# Patient Record
Sex: MALE
[De-identification: names, ages, dates, MRNs, and addresses within clinical notes are randomized per-mention and may not be internally consistent; named-entity substitution may affect disease eponyms.]

---

## 2022-09-21 ENCOUNTER — NON-APPOINTMENT (OUTPATIENT)
Age: 27
End: 2022-09-21

## 2022-09-23 PROBLEM — Z00.00 ENCOUNTER FOR PREVENTIVE HEALTH EXAMINATION: Status: ACTIVE | Noted: 2022-09-23

## 2022-09-26 ENCOUNTER — APPOINTMENT (OUTPATIENT)
Dept: ORTHOPEDIC SURGERY | Facility: CLINIC | Age: 27
End: 2022-09-26

## 2022-09-26 VITALS
DIASTOLIC BLOOD PRESSURE: 75 MMHG | WEIGHT: 210 LBS | BODY MASS INDEX: 29.4 KG/M2 | HEIGHT: 71 IN | SYSTOLIC BLOOD PRESSURE: 137 MMHG | HEART RATE: 78 BPM

## 2022-09-26 DIAGNOSIS — Z78.9 OTHER SPECIFIED HEALTH STATUS: ICD-10-CM

## 2022-09-26 DIAGNOSIS — M79.644 PAIN IN LEFT FINGER(S): ICD-10-CM

## 2022-09-26 DIAGNOSIS — S63.619A UNSPECIFIED SPRAIN OF UNSPECIFIED FINGER, INITIAL ENCOUNTER: ICD-10-CM

## 2022-09-26 DIAGNOSIS — M79.645 PAIN IN LEFT FINGER(S): ICD-10-CM

## 2022-09-26 DIAGNOSIS — S61.319A LACERATION W/OUT FOREIGN BODY OF UNSPECIFIED FINGER WITH DAMAGE TO NAIL, INITIAL ENCOUNTER: ICD-10-CM

## 2022-09-26 PROCEDURE — 73140 X-RAY EXAM OF FINGER(S): CPT | Mod: RT

## 2022-09-26 PROCEDURE — 99204 OFFICE O/P NEW MOD 45 MIN: CPT

## 2022-09-26 NOTE — ASSESSMENT
[FreeTextEntry1] : 26-year-old male with bilateral index finger injuries.  The left nailbed injury should continue with supportive care.  Counseled as to the risks and benefits of nail removal and operative intervention versus local wound care.  Given the lack of subungual hematoma as well as intact nail and no fracture would recommend local wound care to heal the overlying skin.  Counseled that if the nail deformity or pain or signs and symptoms of infection develop he should return and we can address that at that time, but for now I would expect this to heal without surgical intervention so would defer operating.  The right index exam is consistent with an ulnar collateral ligament sprain at the proximal inner phalangeal joint.  He has not immobilized and would recommend ashlyn strapping the index to the middle finger for 2 to 4 weeks to permit range of motion while supporting the digit while the ligament heals.  If he has persistent pain he will follow-up.  Counseled that post injury swelling can last up to 1 year or so swelling without pain does not need to prompt a revisit.\par \par Plan:\par Ashlyn strap right index to long finger for 2-4 weeks to support finger while ligament healing and permit ROM\par Local wound care to left index\par Will follow-up if symptoms worsen, develops symptoms concerning for infection, ongoing pain at right or nail deformity at left

## 2022-09-26 NOTE — PHYSICAL EXAM
[de-identified] : General: No acute distress\par RSP: Nonlabored\par MSK:\par Left upper extremity:\par Left index finger with radial-based laceration over the distal phalanx without involvement of the joint or radial nail fold, laceration just proximal to the base of the nail with exposed plate.  Subungual hematoma there is less than 50%.\par Able to flex and extend fully at the DIP joint with no extensor lag, able to fully flex and extend the PIP and MP joint\par Sensation intact to light touch radial and ulnar aspect of the left index\par Less than 2-second capillary refill at the volar pulp\par Right upper extremity:\par Right index finger with increased laxity at PIP ulnar collateral ligament compared to the contralateral, radial collateral ligament with symmetric laxity with stress exam\par Tender to palpation over the ulnar collateral ligament of the proximal interphalangeal joint\par Minimal swelling\par Able to fully flex and extend at DIP PIP and MP joints in isolation\par Sensation intact to light touch throughout\par Less than 2-second capillary refill [de-identified] : X-ray of bilateral index finger: No fractures nor dislocation, no evidence of subcutaneous emphysema, no joint asymmetry

## 2022-09-26 NOTE — HISTORY OF PRESENT ILLNESS
[FreeTextEntry1] : 26-year-old right-hand-dominant male who presents with bilateral index finger pain.  He reports that on Thursday he closed a door onto his left index finger.  It had immediate pain and swelling.  He noted that it was bleeding at the base of the nail and had developed a subungual hematoma.  Since that time his finger has been improving in terms of his pain and swelling.  He did present to an urgent care but they had no x-rays available to image the finger.  The patient also reports that he injured his right index finger 1 month prior.  He was playing basketball when the ball jammed into his digit.  He denies any type of extension lag of the digit but does localize pain to the ulnar aspect of his PIP joint.  No other injuries.  He denies ecchymosis but does feel as though the digit is more swollen than it is normally.